# Patient Record
(demographics unavailable — no encounter records)

---

## 2022-02-28 NOTE — XRAY REPORT
PROCEDURE:  Chest 2 View X-Ray

 

INDICATIONS:  FOOD IN RESPIRATORY TRACT CAUSING INJURY

 

TECHNIQUE:  2 view(s) of the chest.  

 

COMPARISON:  None.

 

FINDINGS:  

 

Surgical changes and devices:  None.  

 

Lungs and pleura:  No pleural effusions or pneumothorax.  Lungs are clear.  

 

Mediastinum:  Mediastinal contours are normal.  Heart size is normal.  

 

Bones and chest wall:  No suspicious bony abnormalities.  Soft tissues appear unremarkable.  

 

IMPRESSION:  Chest without acute cardiopulmonary abnormalities. No focal airspace disease. No radiopa
que soft tissue foreign bodies.

 

Reviewed by: Yogi Jc MD on 2/28/2022 5:43 PM [facility]

Approved by: Yogi Jc MD on 2/28/2022 5:43 PM [facility]

 

 

Station ID:  [id number]